# Patient Record
Sex: MALE | Race: WHITE | Employment: FULL TIME | URBAN - METROPOLITAN AREA
[De-identification: names, ages, dates, MRNs, and addresses within clinical notes are randomized per-mention and may not be internally consistent; named-entity substitution may affect disease eponyms.]

---

## 2019-02-20 PROBLEM — S52.124D CLOSED NONDISPLACED FRACTURE OF HEAD OF RIGHT RADIUS WITH ROUTINE HEALING: Status: ACTIVE | Noted: 2019-02-20

## 2021-03-17 ENCOUNTER — HOSPITAL ENCOUNTER (OUTPATIENT)
Dept: GENERAL RADIOLOGY | Age: 51
Discharge: HOME | End: 2021-03-17
Attending: PHYSICIAN ASSISTANT
Payer: COMMERCIAL

## 2021-03-17 DIAGNOSIS — M79.601 PAIN OF RIGHT UPPER EXTREMITY: ICD-10-CM

## 2021-03-17 PROCEDURE — 73080 X-RAY EXAM OF ELBOW: CPT | Performed by: PHYSICIAN ASSISTANT

## 2021-03-17 PROCEDURE — 73090 X-RAY EXAM OF FOREARM: CPT | Performed by: PHYSICIAN ASSISTANT

## 2021-06-02 PROCEDURE — 88311 DECALCIFY TISSUE: CPT | Performed by: ORTHOPAEDIC SURGERY

## 2021-06-02 PROCEDURE — 88305 TISSUE EXAM BY PATHOLOGIST: CPT | Performed by: ORTHOPAEDIC SURGERY

## 2025-04-17 ENCOUNTER — HOSPITAL ENCOUNTER (EMERGENCY)
Age: 55
Discharge: HOME | End: 2025-04-17
Attending: EMERGENCY MEDICINE
Payer: COMMERCIAL

## 2025-04-17 VITALS
TEMPERATURE: 98 F | WEIGHT: 170 LBS | HEIGHT: 71 IN | BODY MASS INDEX: 23.8 KG/M2 | OXYGEN SATURATION: 98 % | RESPIRATION RATE: 16 BRPM | DIASTOLIC BLOOD PRESSURE: 88 MMHG | HEART RATE: 78 BPM | SYSTOLIC BLOOD PRESSURE: 125 MMHG

## 2025-04-17 DIAGNOSIS — S03.00XA CLOSED DISLOCATION OF MANDIBLE, INITIAL ENCOUNTER: Primary | ICD-10-CM

## 2025-04-17 PROCEDURE — 96374 THER/PROPH/DIAG INJ IV PUSH: CPT

## 2025-04-17 PROCEDURE — 99285 EMERGENCY DEPT VISIT HI MDM: CPT

## 2025-04-17 PROCEDURE — 21480 CLTX TMPRMAND DISLC 1ST/SBSQ: CPT

## 2025-04-17 PROCEDURE — 96375 TX/PRO/DX INJ NEW DRUG ADDON: CPT

## 2025-04-17 PROCEDURE — 99284 EMERGENCY DEPT VISIT MOD MDM: CPT

## 2025-04-17 RX ORDER — DIAZEPAM 10 MG/2ML
5 INJECTION, SOLUTION INTRAMUSCULAR; INTRAVENOUS ONCE
Status: COMPLETED | OUTPATIENT
Start: 2025-04-17 | End: 2025-04-17

## 2025-04-17 RX ORDER — KETOROLAC TROMETHAMINE 30 MG/ML
30 INJECTION, SOLUTION INTRAMUSCULAR; INTRAVENOUS ONCE
Status: COMPLETED | OUTPATIENT
Start: 2025-04-17 | End: 2025-04-17

## 2025-04-17 NOTE — ED PROVIDER NOTES
Patient Seen in: [facility] In [place]      History     Chief Complaint   Patient presents with    Other     Stated Complaint: jaw locked in open position after yawning    Subjective:   The history is provided by the patient and the spouse.       54-year-old man with history of jaw dislocation presenting to the ER with inability to close his mouth for the past hour.  Patient states he yawned this morning and his jaw dislocated.  Denies any falls or injuries.  Has mild pain.  He is unable to swallow his secretions so he is drooling.    History of Present Illness               Objective:     History reviewed. No pertinent past medical history.           Past Surgical History:   Procedure Laterality Date    Other surgical history Right 2021    elbow surg                Social History     Socioeconomic History    Marital status:    Tobacco Use    Smoking status: Never    Smokeless tobacco: Never   Vaping Use    Vaping status: Never Used   Substance and Sexual Activity    Alcohol use: Yes    Drug use: No                                Physical Exam     ED Triage Vitals [04/17/25 0558]   /85   Pulse 67   Resp 19   Temp 98.2 °F (36.8 °C)   Temp src Temporal   SpO2 98 %   O2 Device None (Room air)       Current Vitals:   Vital Signs  BP: 125/88  Pulse: 78  Resp: 16  Temp: 98.2 °F (36.8 °C)  Temp src: Temporal    Oxygen Therapy  SpO2: 98 %  O2 Device: None (Room air)        Physical Exam  Vitals and nursing note reviewed.   Constitutional:       General: He is not in acute distress.     Appearance: Normal appearance.   HENT:      Head: Normocephalic and atraumatic.      Mouth/Throat:      Pharynx: Posterior oropharyngeal erythema present.      Comments: Mallampati I  Patient's mouth is wide open, unable to close, consistent with temperamental dislocation.  No bony tenderness.  Eyes:      Extraocular Movements: Extraocular movements intact.      Pupils: Pupils are equal, round, and reactive  to light.   Cardiovascular:      Rate and Rhythm: Normal rate and regular rhythm.      Pulses: Normal pulses.      Heart sounds: Normal heart sounds.   Pulmonary:      Effort: Pulmonary effort is normal.      Breath sounds: Normal breath sounds.   Musculoskeletal:      Cervical back: Neck supple.   Skin:     General: Skin is warm and dry.   Neurological:      Mental Status: He is alert and oriented to person, place, and time.   Psychiatric:         Mood and Affect: Mood normal.         Behavior: Behavior normal.           Physical Exam                ED Course   Labs Reviewed - No data to display    ED Course as of 04/17/25 1442  ------------------------------------------------------------  Time: 04/17 0645  Comment: Attempted bedside reduction without success.  Will give IV analgesic and valium for relaxation and reattempt  ------------------------------------------------------------  Time: 04/17 0739  Comment: Attempted bedside reduction, no success for intra oral reduction x 2, successful extraoral reduction with anterior-posterior traction-counter traction approach.  Ace wrapped his head/jaw. Advised on soft/liquid diet x 1 week and oral surgery follow up     Results            No results found.                       MDM              Medical Decision Making  Patient presented with an atraumatic dislocation of his temporomandibular joint anteriorly  After IV Valium patient was much more relaxed and I was able to manually reduce the dislocation  Patient had an Ace wrap, advised liquid/soft diet until oral surgery follow-up and further instructions          Disposition and Plan     Clinical Impression:  1. Closed dislocation of mandible, initial encounter         Disposition:  Discharge  4/17/2025  7:42 am    Follow-up:  Airan Escamilla DDS  [address] [place] [id number]  [phone]    Schedule an appointment as soon as possible for a visit on 4/18/2025            Medications Prescribed:  Discharge  Medication List as of 4/17/2025  7:44 AM          Supplementary Documentation:

## 2025-04-17 NOTE — ED INITIAL ASSESSMENT (HPI)
Jaw locked in open position after yawning. Happened many years ago and a dentist was able to manually put jaw back in place